# Patient Record
Sex: MALE | URBAN - METROPOLITAN AREA
[De-identification: names, ages, dates, MRNs, and addresses within clinical notes are randomized per-mention and may not be internally consistent; named-entity substitution may affect disease eponyms.]

---

## 2018-05-04 VITALS
HEIGHT: 70 IN | HEART RATE: 84 BPM | SYSTOLIC BLOOD PRESSURE: 130 MMHG | RESPIRATION RATE: 20 BRPM | TEMPERATURE: 98 F | OXYGEN SATURATION: 99 % | DIASTOLIC BLOOD PRESSURE: 74 MMHG | WEIGHT: 253.53 LBS

## 2018-05-04 NOTE — ASU PATIENT PROFILE, ADULT - NS PRO AD PATIENT TYPE
Health Care Proxy (HCP) Health Care Proxy (HCP)/[facility] Proxy (HCP)/[facility] (baljinder) [phone]

## 2018-05-07 ENCOUNTER — OUTPATIENT (OUTPATIENT)
Dept: OUTPATIENT SERVICES | Facility: HOSPITAL | Age: 18
LOS: 1 days | Discharge: HOME | End: 2018-05-07
Payer: COMMERCIAL

## 2018-05-07 VITALS
OXYGEN SATURATION: 98 % | HEART RATE: 92 BPM | SYSTOLIC BLOOD PRESSURE: 127 MMHG | RESPIRATION RATE: 22 BRPM | DIASTOLIC BLOOD PRESSURE: 73 MMHG

## 2018-05-07 DIAGNOSIS — Z98.890 OTHER SPECIFIED POSTPROCEDURAL STATES: Chronic | ICD-10-CM

## 2018-05-07 PROCEDURE — C1889: CPT

## 2018-05-07 PROCEDURE — 30140 RESECT INFERIOR TURBINATE: CPT

## 2018-05-07 PROCEDURE — 88311 DECALCIFY TISSUE: CPT

## 2018-05-07 PROCEDURE — 88300 SURGICAL PATH GROSS: CPT

## 2018-05-07 PROCEDURE — 30462 REVISION OF NOSE: CPT

## 2018-05-07 PROCEDURE — 88305 TISSUE EXAM BY PATHOLOGIST: CPT

## 2018-05-07 PROCEDURE — C2625: CPT

## 2018-05-07 RX ORDER — HYDROMORPHONE HYDROCHLORIDE 2 MG/ML
0.5 INJECTION INTRAMUSCULAR; INTRAVENOUS; SUBCUTANEOUS
Qty: 0 | Refills: 0 | Status: DISCONTINUED | OUTPATIENT
Start: 2018-05-07 | End: 2018-05-07

## 2018-05-07 RX ORDER — SODIUM CHLORIDE 9 MG/ML
1000 INJECTION, SOLUTION INTRAVENOUS
Qty: 0 | Refills: 0 | Status: DISCONTINUED | OUTPATIENT
Start: 2018-05-07 | End: 2018-05-07

## 2018-05-15 LAB
SURGICAL PATHOLOGY STUDY: SIGNIFICANT CHANGE UP
SURGICAL PATHOLOGY STUDY: SIGNIFICANT CHANGE UP

## 2023-09-09 NOTE — ASU PREOP CHECKLIST - VERIFY SURGICAL SITE/SIDE WITH PATIENT
The patient contacted me via [employer] and I replied as follows on 9/9/23:    You should go ahead and contact the health information management department. There is a form they need completed. Their phone number is [phone]. Let them know you are requesting an addendum and they will let you know about the form they need completed.     Adrian Sheppard, PhD, [facility]           Previous Messages       ----- Message -----        From:Juli Nam        Sent:9/9/2023 11:25 AM CDT          To:Chris Sheppard, PhD     Subject:Adding Addendum     I've made a [employer] account but still have no idea how to get the ADHD Addendum added. Would I message that through here so you can add it or another way?   Thanks,   Rin     
done